# Patient Record
Sex: FEMALE | Race: WHITE | NOT HISPANIC OR LATINO | Employment: UNEMPLOYED | URBAN - METROPOLITAN AREA
[De-identification: names, ages, dates, MRNs, and addresses within clinical notes are randomized per-mention and may not be internally consistent; named-entity substitution may affect disease eponyms.]

---

## 2024-08-02 ENCOUNTER — APPOINTMENT (OUTPATIENT)
Dept: RADIOLOGY | Facility: HOSPITAL | Age: 54
End: 2024-08-02
Attending: EMERGENCY MEDICINE
Payer: COMMERCIAL

## 2024-08-02 ENCOUNTER — HOSPITAL ENCOUNTER (EMERGENCY)
Facility: HOSPITAL | Age: 54
Discharge: HOME | End: 2024-08-02
Payer: COMMERCIAL

## 2024-08-02 VITALS
SYSTOLIC BLOOD PRESSURE: 121 MMHG | TEMPERATURE: 97.6 F | DIASTOLIC BLOOD PRESSURE: 78 MMHG | HEIGHT: 66 IN | HEART RATE: 76 BPM | RESPIRATION RATE: 18 BRPM | WEIGHT: 247.7 LBS | OXYGEN SATURATION: 98 % | BODY MASS INDEX: 39.81 KG/M2
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
## 2024-08-02 DIAGNOSIS — M79.601 PAIN OF RIGHT UPPER EXTREMITY: ICD-10-CM

## 2024-08-02 PROCEDURE — 99283 EMERGENCY DEPT VISIT LOW MDM: CPT | Mod: 25

## 2024-08-02 PROCEDURE — 250N000013 HC RX MED GY IP 250 OP 250 PS 637

## 2024-08-02 PROCEDURE — 29125 APPL SHORT ARM SPLINT STATIC: CPT | Mod: RT

## 2024-08-02 PROCEDURE — 73030 X-RAY EXAM OF SHOULDER: CPT | Mod: RT

## 2024-08-02 PROCEDURE — 73110 X-RAY EXAM OF WRIST: CPT | Mod: RT

## 2024-08-02 RX ORDER — OXYCODONE HYDROCHLORIDE 5 MG/1
5 TABLET ORAL ONCE
Status: COMPLETED | OUTPATIENT
Start: 2024-08-02 | End: 2024-08-02

## 2024-08-02 RX ORDER — OXYCODONE HYDROCHLORIDE 5 MG/1
5 TABLET ORAL EVERY 6 HOURS PRN
Qty: 3 TABLET | Refills: 0 | Status: SHIPPED | OUTPATIENT
Start: 2024-08-02

## 2024-08-02 RX ORDER — HYDROCODONE BITARTRATE AND ACETAMINOPHEN 5; 325 MG/1; MG/1
1 TABLET ORAL ONCE
Status: DISCONTINUED | OUTPATIENT
Start: 2024-08-02 | End: 2024-08-02

## 2024-08-02 RX ADMIN — OXYCODONE HYDROCHLORIDE 5 MG: 5 TABLET ORAL at 20:04

## 2024-08-02 ASSESSMENT — COLUMBIA-SUICIDE SEVERITY RATING SCALE - C-SSRS
2. HAVE YOU ACTUALLY HAD ANY THOUGHTS OF KILLING YOURSELF IN THE PAST MONTH?: NO
6. HAVE YOU EVER DONE ANYTHING, STARTED TO DO ANYTHING, OR PREPARED TO DO ANYTHING TO END YOUR LIFE?: NO
1. IN THE PAST MONTH, HAVE YOU WISHED YOU WERE DEAD OR WISHED YOU COULD GO TO SLEEP AND NOT WAKE UP?: NO

## 2024-08-02 NOTE — ED PROVIDER NOTES
EMERGENCY DEPARTMENT ENCOUNTER      NAME: Cat Hill  AGE: 54 year old female  YOB: 1970  MRN: [id number]  EVALUATION DATE & TIME: 08/02/24 6:20 PM    PCP: No primary care provider on file.    ED PROVIDER: Mimi Lezama PA-C      CHIEF COMPLAINT:  Fall and Arm Pain      FINAL IMPRESSION:  1. Pain of right upper extremity    2. Fall, initial encounter          ED COURSE & MEDICAL DECISION MAKING:  Pertinent Labs & Imaging studies reviewed. (See chart for details)    The patient is a 54 year old-year-old female with no pertinent medical history presenting to the emergency department for evaluation of right arm and wrist pain after a fall at home prior to arrival. She accidentally slipped while walking in the kitchen. She denies any symptoms and was in her normal state of health before the fall. She did not hit her head, no loss of consciousness. She is not on blood thinning medication.    Vitals reviewed and within normal limits. On exam, patient is well-appearing and without signs of serious injury.  There is mild swelling of right wrist. There is mild bony tenderness with palpation of deltoid region and carpal bones proximal to thenar aspect of wrist/hand. No snuffbox tenderness on my exam, does have tenderness just proximal to snuffbox however. Strength  5/5 at shoulder, elbow. 4/5 strength at wrist secondary to pain.  strength 5/5, has intact flexion and extension of all 5 fingers with no rotational deformity. No hot, painful, or swollen joint.  Sensation grossly intact to light touch.  Distal extremity is warm with pulses intact.  No overlying skin changes.    Presentation most consistent with right wrist pain after fall.  XR imaging of shoulder without evidence of fracture or dislocation. XR imaging of wrist showed possible nondisplaced Vadnais fracture versus artifact, recommended clinical correlation with snuffbox tenderness. No snuffbox tenderness but she does have tenderness  just proximal to snuffbox, therefore using shared decision making will place in soft splint and plan for close outpatient follow up with [facility] for recheck. Very mild tenderness to lateral epicondyle and olecranon process and with full range of motion intact and painless at the elbow, I have low clinical suspicion for fracture or dislocation.  I considered, but doubt based on history and evaluation today, septic arthritis, compartment syndrome, shoulder dislocation, AC joint injury, rotator cuff tear, biceps tendon rupture, cellulitis, septic bursitis, gout, pseudogout, elbow dislocation.    Discussed plan for discharge to home with splint in place and close outpatient follow up with [facility]. She feels improved after a dose of oxycodone here in the emergency department. Using shared decision making, will prescribe a few tablets of oxycodone for home for breakthrough pain while taking acetaminophen and ibuprofen. The patient verbalized understanding and is comfortable with this plan. Symptomatic home cares discussed. Emphasized importance of follow up with primary care clinician. Strict return precautions discussed.     At the conclusion of the encounter I discussed the results of all of the tests and the disposition. The questions were answered. The patient or family acknowledged understanding and was agreeable with the care plan.       ED COURSE:  5:52 I met and introduced myself to the patient. I gathered initial history and performed an initial physical exam. We discussed options and plan for diagnostics and treatment here in the ED.  7:38 PM I discussed the plan for discharge with the patient, and patient is agreeable. We discussed supportive cares at home and reasons for return to the ER including new or worsening symptoms - all questions and concerns addressed to the best of my ability. Strict return precautions discussed. Patient to be discharged by RN.      Medical Decision  Making  Obtained supplemental history:Supplemental history obtained?: Documented in chart and Family Member/Significant Other  Reviewed external records: External records reviewed?: Documented in chart  Care impacted by chronic illness:N/A  Care significantly affected by social determinants of health:N/A  Did you consider but not order tests?: Work up considered but not performed and documented in chart, if applicable  Did you interpret images independently?: Independent interpretation of ECG and images noted in documentation, when applicable.  Consultation discussion with other provider:Did you involve another provider (consultant, , pharmacy, etc.)?: No  Discharge. I prescribed additional prescription strength medication(s) as charted. See documentation for any additional details.      CRITICAL CARE:  Not applicable      MEDICATIONS GIVEN IN THE EMERGENCY:  Medications   oxyCODONE (ROXICODONE) tablet 5 mg (5 mg Oral $Given 8/2/24 2004)       NEW PRESCRIPTIONS STARTED AT TODAY'S ER VISIT  Discharge Medication List as of 8/2/2024  8:06 PM        START taking these medications    Details   oxyCODONE (ROXICODONE) 5 MG tablet Take 1 tablet (5 mg) by mouth every 6 hours as needed for breakthrough pain, Disp-3 tablet, R-0, Local Print                =================================================================    HPI    Patient information was obtained from: patient and her family member    Use of Intrepreter: N/A      Cat Hill is a 54 year old female with no documented pertinent medical history who presents to the emergency department for evaluation of arm pain.     Patient stated that she was in her kitchen at 3:30 PM (~ 3 hours ago) when she fell and hit her arm on an air purifier. Patient noted that her right shoulder, and arm hurt. Patient described the pain as throbbing at a 4/10 level of pain. Patient noted that she is having muscle spasms in her right upper arm/shoulder area. Patient denied hitting  "her head, being on blood thinners, numbness, tingling, vision changes, or mentioned any other symptoms. Patient noted she hasn't taken anything for her pain.       PAST MEDICAL HISTORY:  No past medical history on file.    PAST SURGICAL HISTORY:  No past surgical history on file.    CURRENT MEDICATIONS:    None       ALLERGIES:  Allergies   Allergen Reactions    Codeine Hallucination    Duloxetine        FAMILY HISTORY:  No family history on file.    SOCIAL HISTORY:        VITALS:    First Vitals:  No data found.      No data found.        PHYSICAL EXAM  VITAL SIGNS: /78   Pulse 76   Temp 97.6  F (36.4  C) (Tympanic)   Resp 18   Ht 1.676 m (5' 6\")   Wt 112.4 kg (247 lb 11.2 oz)   SpO2 98%   BMI 39.98 kg/m     GENERAL: Awake, alert, answering questions appropriately, in no acute distress.  HEENT: Normocephalic, atraumatic. Moist mucous membranes.   SPEECH:  Easy to understand speech, Normal volume and jazmyn. Normal phonation.  PULMONARY: No respiratory distress, Breathing comfortably on room air. Lungs clear to auscultation bilaterally.  CARDIOVASCULAR: Regular rate and rhythm, radial pulses present, symmetric, and normal.  ABDOMINAL: Soft, Nondistended, Nontender, No rebound or guarding.  EXTREMITIES: Extremities are warm and well perfused.   Focused Right Upper Extremity Exam:  Inspection: No significant erythema, bruising, lacerations or discoloration. Normal ROM shoulder. Full range of motion at elbow without pain. Decreased ROM with flexion and extension at wrist secondary to pain.  Palpation: No warmth, induration or malalignment. Non-tender to palpation over bony clavicle, AC joint, scapula, mid or distal humerus. Biceps tendon intact. Tenderness to deltoid region and carpal bones proximal to thenar aspect of wrist/hand. Very mild tenderness to lateral epicondyle and olecranon process. No tenderness to palpation along the finger joints, metacarpals, or in the anatomic snuff box.  Strength: 5/5 " strength at shoulder, elbow. 4/5 strength at wrist secondary to pain.  strength 5/5, has intact flexion and extension of all 5 fingers. No rotational deformity noted.  Sensation: Sensation grossly intact to light touch throughout all nerve distributions.  Perfusion: +2 radial and ulnar pulses. Cap refill brisk x5 fingers.  MSK exam is otherwise unremarkable  NEUROLOGIC: GCS 15. Moving all extremities spontaneously.   SKIN: Exposed areas of skin warm, dry, no rashes.  PSYCH: Normal mood and affect.           RADIOLOGY/LAB:  Reviewed all pertinent imaging. Please see official radiology report.  All pertinent labs reviewed and interpreted.  Results for orders placed or performed during the hospital encounter of 08/02/24   XR Wrist Right G/E 3 Views    Impression    IMPRESSION: Subtle irregularity of the scaphoid waist, only seen on one view, favored to be artifactual rather Vadnais nondisplaced fracture, however recommend clinical correlation for snuffbox tenderness.   XR Shoulder Right 2 Views    Impression    IMPRESSION: Normal joint spaces and alignment. No fracture.             I, Talat Tristan, am serving as a scribe to document services personally performed by Mimi Lezama PA-C, based on my observation and the provider's statements to me. I, Mimi Lezama PA-C attest that Talat Tristan is acting in a scribe capacity, has observed my performance of the services and has documented them in accordance with my direction.         Mimi Lezama PA-C  Emergency Medicine  [facility] EMERGENCY DEPARTMENT  [address] [place] [id number] [phone]  Dept: [phone]      Mimi Lezama PA-C  08/14/24 [id number]

## 2024-08-02 NOTE — ED TRIAGE NOTES
Patient comes in after a fall. She tripped in the kitchen and hit her right arm on bookshelf.  No LOC, not on thinners, did not hit her head. Right  arm and wrist pain. Swelling noted to right wrist.

## 2024-08-03 NOTE — DISCHARGE INSTRUCTIONS
You were seen in the emergency department today for right arm pain after a fall.    To help with pain:  - Ice the injury for 20 minutes, 3-5 times per day (or up to every 2 hours as needed) for the first 24-72 hours. You can either use an ice pack or plastic bag filled with ice, cover either one with a damp cloth to prevent the cold from burning your skin.   - You may take 650-1000mg of Acetaminophen (Tylenol).  Please do not use more than 3000 mg in a 24 hour period. Tylenol is an effective drug when taken at the prescribed dosages but can cause bodily injury including liver damage if taken too often or at too high of dose.  - You can take 600mg of Ibuprofen (Motrin, Advil) by mouth with food every 6-8 hours (no more than 3200mg in 24hrs).    - You can take one or the other every 3 hours while awake (such that each is taken every 6 hours). For example, if you take Tylenol when you get home then you would take ibuprofen 3 hours later followed by another Tylenol dose 3 hours after that. Write down the times you are taking both medications to ensure appropriate time in between doses.  - You can take oxycodone 5 mg every 6 hours as needed for breakthrough pain while taking acetaminophen and ibuprofen. You have been prescribed a narcotic pain medication that has risk for addiction with prolonged use, so please use sparingly. Additionally, narcoticsare medications that are sedating (will make you sleepy), so do not drive or operate machinery while taking this medication. Avoid alcohol or other sedating medicines such as benzodiazepines while taking narcotics due torisk for increased sedation and difficulty breathing.     Narcotics will cause constipation. If you need to take this medication please consider taking an over-the-counter stool softener and laxative, such as SennaPlus, to prevent constipation from developing. Nausea is a side effect of narcotic use. Possible additional side effects include vomiting, itching,  and dizziness/lightheadedness.     Please return to the Emergency Department if you have uncontrolled/worsening pain, difficulty breathing, numbness, inability to keep food/fluids down, or any other new or concerning symptoms. Otherwise please follow up with [facility] in the next few days for recheck.    Included is some information that you might find informative and useful.

## 2024-08-23 ENCOUNTER — TRANSFERRED RECORDS (OUTPATIENT)
Dept: HEALTH INFORMATION MANAGEMENT | Facility: CLINIC | Age: 54
End: 2024-08-23
Payer: COMMERCIAL

## 2024-09-29 ENCOUNTER — HEALTH MAINTENANCE LETTER (OUTPATIENT)
Age: 54
End: 2024-09-29

## 2024-11-04 ENCOUNTER — TRANSFERRED RECORDS (OUTPATIENT)
Dept: HEALTH INFORMATION MANAGEMENT | Facility: CLINIC | Age: 54
End: 2024-11-04
Payer: COMMERCIAL

## 2024-12-16 ENCOUNTER — TRANSFERRED RECORDS (OUTPATIENT)
Dept: HEALTH INFORMATION MANAGEMENT | Facility: CLINIC | Age: 54
End: 2024-12-16
Payer: COMMERCIAL

## 2025-01-27 ENCOUNTER — TRANSFERRED RECORDS (OUTPATIENT)
Dept: HEALTH INFORMATION MANAGEMENT | Facility: CLINIC | Age: 55
End: 2025-01-27
Payer: COMMERCIAL